# Patient Record
Sex: FEMALE | Race: AMERICAN INDIAN OR ALASKA NATIVE
[De-identification: names, ages, dates, MRNs, and addresses within clinical notes are randomized per-mention and may not be internally consistent; named-entity substitution may affect disease eponyms.]

---

## 2019-03-03 ENCOUNTER — HOSPITAL ENCOUNTER (EMERGENCY)
Dept: HOSPITAL 5 - ED | Age: 49
Discharge: HOME | End: 2019-03-03
Payer: MEDICARE

## 2019-03-03 VITALS — SYSTOLIC BLOOD PRESSURE: 130 MMHG | DIASTOLIC BLOOD PRESSURE: 62 MMHG

## 2019-03-03 DIAGNOSIS — Z90.49: ICD-10-CM

## 2019-03-03 DIAGNOSIS — L02.211: ICD-10-CM

## 2019-03-03 DIAGNOSIS — J45.909: ICD-10-CM

## 2019-03-03 DIAGNOSIS — E11.9: ICD-10-CM

## 2019-03-03 DIAGNOSIS — L02.11: Primary | ICD-10-CM

## 2019-03-03 PROCEDURE — 99282 EMERGENCY DEPT VISIT SF MDM: CPT

## 2019-03-03 NOTE — EMERGENCY DEPARTMENT REPORT
Abscess Boil HPI





- HPI


Chief Complaint: Skin/Abscess/Foreign Body


Stated Complaint: FACE PAIN


Time Seen by Provider: 03/03/19 15:46


Duration: >1 Week


Location: Neck


Severity: Mild


History: Yes Pain, Yes Purulent Drainage, Yes Previous History, Yes Insect Bite,

No Fever, No Numbness, No Foreign Body


HPI: Patient is a 49-year-old female who comes to the ER today with concerns 

about some chronic abscesses on her neck from what she says is a spider bite.  

Patient's very histrionic and dates her initial bite back to summer of 2018.  

She shows me an old scarred area that she states is an abscess on her left 

abdomen.  She states that that was created by brown recluse spider.  Patient 

then shows me her neck which has 3 areas, that resemble folliculitis, but the 

patient says are forming abscesses from a black  bite.  Patient states she 

no longer lives where she was living getting bit by the spiders.  Patient is 

tearful about these bites.  She is here for antibiotics.


Home Medications: 


                                  Previous Rx's











 Medication  Instructions  Recorded  Last Taken  Type


 


Sulfamethoxazole/Trimethoprim 1 each PO BID #10 tablet 03/03/19 Unknown Rx





[Bactrim DS TAB]    











Allergies/Adverse Reactions: 


                                    Allergies











Allergy/AdvReac Type Severity Reaction Status Date / Time


 


No Known Allergies Allergy   Unverified 03/03/19 15:48














ED Review of Systems


ROS: 


Stated complaint: FACE PAIN


Other details as noted in HPI





Comment: All other systems reviewed and negative


Constitutional: denies: chills, fever


Eyes: denies: eye pain


ENT: denies: throat pain


Respiratory: denies: orthopnea


Cardiovascular: denies: orthopnea


Endocrine: denies: intolerance to cold


Gastrointestinal: denies: nausea


Genitourinary: denies: urgency


Musculoskeletal: denies: back pain


Skin: as per HPI, lesions.  denies: rash


Neurological: denies: headache, weakness


Psychiatric: denies: anxiety, depression


Hematological/Lymphatic: denies: easy bleeding





ED Past Medical Hx





- Past Medical History


Previous Medical History?: Yes


Hx Diabetes: Yes


Hx Asthma: Yes


Additional medical history: Broken right femur, Multiple sclerosis, Unsteady 

gait





- Surgical History


Past Surgical History?: Yes


Hx Cholecystectomy: Yes





- Family History


Family history: no significant





- Social History


Smoking Status: Never Smoker


Substance Use Type: Prescribed





- Medications


Home Medications: 


                                Home Medications











 Medication  Instructions  Recorded  Confirmed  Last Taken  Type


 


Sulfamethoxazole/Trimethoprim 1 each PO BID #10 tablet 03/03/19  Unknown Rx





[Bactrim DS TAB]     














ED Abscess Boil Physical Exam





- Exam


General: 


Vital signs noted. No distress. Alert and acting appropriately.





Exam: Yes Normal Circulation, No Surrounding Cellulites/Erythema, No Heart 

Murmur


Exam: 3 areas under chin that are draining. pt states where abscesses from 

spider bite. see hpi.





ED Course


                                   Vital Signs











  03/03/19





  15:48


 


Temperature 98.2 F


 


Pulse Rate 78


 


Blood Pressure 117/44


 


O2 Sat by Pulse 99





Oximetry 














- Reevaluation(s)


Reevaluation #1: 





03/03/19 16:31


no I/D needed


Critical care attestation.: 


If time is entered above; I have spent that time in minutes in the direct care o

f this critically ill patient, excluding procedure time.








ED Medical Decision Making





- Medical Decision Making





a/c skin infection which pt states is due to spider bites many months ago





The skin is open and draining. 


Will dc on bactrum with derm follow up. She sees a pain doctor for her chronic 

pain- I told her to continue those meds for the pain. 





pt is non toxic, afebrile and ambulatory on discharge





                                   Vital Signs











  03/03/19





  15:48


 


Temperature 98.2 F


 


Pulse Rate 78


 


Blood Pressure 117/44


 


O2 Sat by Pulse 99





Oximetry 














ED Disposition


Clinical Impression: 


 Abscess





Disposition: DC-01 TO HOME OR SELFCARE


Is pt being admited?: No


Does the pt Need Aspirin: No


Condition: Stable


Instructions:  Abscess (ED)


Additional Instructions: 


clean with epsom salts/ water





follow up with dermatology


referral below








Prescriptions: 


Sulfamethoxazole/Trimethoprim [Bactrim DS TAB] 1 each PO BID #10 tablet


Referrals: 


PINKY CARRILLO MD [Referring] - 3-5 Days


Time of Disposition: 16:23

## 2019-03-03 NOTE — EMERGENCY DEPARTMENT REPORT
Stated Complaint: FACE PAIN


Time Seen by Provider: 03/03/19 15:46





- HPI


History of Present Illness: 





Pt is complaining of a "spider bite" that occurred june 2018 on left lateral 

abdomen, and  again Jan 2019 under the jaw


she states she was seen for this at [facility] [place] and was put on antibiotics but not sure 

which medication 


she states she has noticed some drainage from the nodules present on the chin, 

but not on the abdomen 


no fever


has had episodes of abscess in the past present in the axilla region 


she has a hx of DM which pt reports is under control 





VSS








MSE complete 


 


MSE screening note: 


Focused history and physical exam performed.


Due to findings the following was ordered:











ED Disposition for MSE


Condition: Stable